# Patient Record
Sex: FEMALE | Race: WHITE | NOT HISPANIC OR LATINO | Employment: OTHER | URBAN - NONMETROPOLITAN AREA
[De-identification: names, ages, dates, MRNs, and addresses within clinical notes are randomized per-mention and may not be internally consistent; named-entity substitution may affect disease eponyms.]

---

## 2023-11-01 ENCOUNTER — HOSPITAL ENCOUNTER (EMERGENCY)
Facility: HOSPITAL | Age: 83
Discharge: HOME | End: 2023-11-01
Attending: STUDENT IN AN ORGANIZED HEALTH CARE EDUCATION/TRAINING PROGRAM
Payer: MEDICARE

## 2023-11-01 VITALS
BODY MASS INDEX: 26.88 KG/M2 | HEART RATE: 61 BPM | TEMPERATURE: 98.5 F | OXYGEN SATURATION: 97 % | HEIGHT: 60 IN | WEIGHT: 136.91 LBS | RESPIRATION RATE: 16 BRPM | DIASTOLIC BLOOD PRESSURE: 66 MMHG | SYSTOLIC BLOOD PRESSURE: 147 MMHG

## 2023-11-01 DIAGNOSIS — E83.42 HYPOMAGNESEMIA: ICD-10-CM

## 2023-11-01 DIAGNOSIS — R11.2 NAUSEA VOMITING AND DIARRHEA: Primary | ICD-10-CM

## 2023-11-01 DIAGNOSIS — E87.6 HYPOKALEMIA: ICD-10-CM

## 2023-11-01 DIAGNOSIS — R19.7 NAUSEA VOMITING AND DIARRHEA: Primary | ICD-10-CM

## 2023-11-01 LAB
ALBUMIN SERPL BCP-MCNC: 4.6 G/DL (ref 3.5–5)
ALP SERPL-CCNC: 110 U/L (ref 34–104)
ALT SERPL W P-5'-P-CCNC: 40 U/L (ref 7–52)
ANION GAP SERPL CALCULATED.3IONS-SCNC: 11 MMOL/L
AST SERPL W P-5'-P-CCNC: 26 U/L (ref 13–39)
BASOPHILS # BLD AUTO: 0.01 THOUSANDS/ÂΜL (ref 0–0.1)
BASOPHILS NFR BLD AUTO: 0 % (ref 0–1)
BILIRUB SERPL-MCNC: 0.99 MG/DL (ref 0.2–1)
BUN SERPL-MCNC: 19 MG/DL (ref 5–25)
CALCIUM SERPL-MCNC: 10.1 MG/DL (ref 8.4–10.2)
CHLORIDE SERPL-SCNC: 106 MMOL/L (ref 96–108)
CO2 SERPL-SCNC: 24 MMOL/L (ref 21–32)
CREAT SERPL-MCNC: 0.97 MG/DL (ref 0.6–1.3)
EOSINOPHIL # BLD AUTO: 0.05 THOUSAND/ÂΜL (ref 0–0.61)
EOSINOPHIL NFR BLD AUTO: 1 % (ref 0–6)
ERYTHROCYTE [DISTWIDTH] IN BLOOD BY AUTOMATED COUNT: 12.2 % (ref 11.6–15.1)
GFR SERPL CREATININE-BSD FRML MDRD: 53 ML/MIN/1.73SQ M
GLUCOSE SERPL-MCNC: 128 MG/DL (ref 65–140)
HCT VFR BLD AUTO: 39.1 % (ref 34.8–46.1)
HGB BLD-MCNC: 13.6 G/DL (ref 11.5–15.4)
IMM GRANULOCYTES # BLD AUTO: 0.01 THOUSAND/UL (ref 0–0.2)
IMM GRANULOCYTES NFR BLD AUTO: 0 % (ref 0–2)
LYMPHOCYTES # BLD AUTO: 1.06 THOUSANDS/ÂΜL (ref 0.6–4.47)
LYMPHOCYTES NFR BLD AUTO: 15 % (ref 14–44)
MAGNESIUM SERPL-MCNC: 1.7 MG/DL (ref 1.9–2.7)
MCH RBC QN AUTO: 30.8 PG (ref 26.8–34.3)
MCHC RBC AUTO-ENTMCNC: 34.8 G/DL (ref 31.4–37.4)
MCV RBC AUTO: 89 FL (ref 82–98)
MONOCYTES # BLD AUTO: 0.94 THOUSAND/ÂΜL (ref 0.17–1.22)
MONOCYTES NFR BLD AUTO: 13 % (ref 4–12)
NEUTROPHILS # BLD AUTO: 4.99 THOUSANDS/ÂΜL (ref 1.85–7.62)
NEUTS SEG NFR BLD AUTO: 71 % (ref 43–75)
NRBC BLD AUTO-RTO: 0 /100 WBCS
PLATELET # BLD AUTO: 268 THOUSANDS/UL (ref 149–390)
PMV BLD AUTO: 11.1 FL (ref 8.9–12.7)
POTASSIUM SERPL-SCNC: 3.3 MMOL/L (ref 3.5–5.3)
PROT SERPL-MCNC: 7.6 G/DL (ref 6.4–8.4)
RBC # BLD AUTO: 4.42 MILLION/UL (ref 3.81–5.12)
SODIUM SERPL-SCNC: 141 MMOL/L (ref 135–147)
WBC # BLD AUTO: 7.06 THOUSAND/UL (ref 4.31–10.16)

## 2023-11-01 PROCEDURE — 85025 COMPLETE CBC W/AUTO DIFF WBC: CPT | Performed by: STUDENT IN AN ORGANIZED HEALTH CARE EDUCATION/TRAINING PROGRAM

## 2023-11-01 PROCEDURE — 80053 COMPREHEN METABOLIC PANEL: CPT | Performed by: STUDENT IN AN ORGANIZED HEALTH CARE EDUCATION/TRAINING PROGRAM

## 2023-11-01 PROCEDURE — 99284 EMERGENCY DEPT VISIT MOD MDM: CPT | Performed by: STUDENT IN AN ORGANIZED HEALTH CARE EDUCATION/TRAINING PROGRAM

## 2023-11-01 PROCEDURE — 96365 THER/PROPH/DIAG IV INF INIT: CPT

## 2023-11-01 PROCEDURE — 96367 TX/PROPH/DG ADDL SEQ IV INF: CPT

## 2023-11-01 PROCEDURE — 96375 TX/PRO/DX INJ NEW DRUG ADDON: CPT

## 2023-11-01 PROCEDURE — 36415 COLL VENOUS BLD VENIPUNCTURE: CPT | Performed by: STUDENT IN AN ORGANIZED HEALTH CARE EDUCATION/TRAINING PROGRAM

## 2023-11-01 PROCEDURE — 83735 ASSAY OF MAGNESIUM: CPT | Performed by: STUDENT IN AN ORGANIZED HEALTH CARE EDUCATION/TRAINING PROGRAM

## 2023-11-01 PROCEDURE — 99283 EMERGENCY DEPT VISIT LOW MDM: CPT

## 2023-11-01 RX ORDER — SODIUM CHLORIDE, SODIUM GLUCONATE, SODIUM ACETATE, POTASSIUM CHLORIDE, MAGNESIUM CHLORIDE, SODIUM PHOSPHATE, DIBASIC, AND POTASSIUM PHOSPHATE .53; .5; .37; .037; .03; .012; .00082 G/100ML; G/100ML; G/100ML; G/100ML; G/100ML; G/100ML; G/100ML
1000 INJECTION, SOLUTION INTRAVENOUS ONCE
Status: COMPLETED | OUTPATIENT
Start: 2023-11-01 | End: 2023-11-01

## 2023-11-01 RX ORDER — LINACLOTIDE 290 UG/1
290 CAPSULE, GELATIN COATED ORAL DAILY
COMMUNITY

## 2023-11-01 RX ORDER — ATORVASTATIN CALCIUM 40 MG/1
40 TABLET, FILM COATED ORAL DAILY
COMMUNITY
Start: 2023-10-30

## 2023-11-01 RX ORDER — LISINOPRIL 10 MG/1
10 TABLET ORAL DAILY
COMMUNITY
Start: 2023-07-06 | End: 2024-07-05

## 2023-11-01 RX ORDER — METOPROLOL SUCCINATE 25 MG/1
25 TABLET, EXTENDED RELEASE ORAL DAILY
COMMUNITY
Start: 2023-06-07 | End: 2024-06-06

## 2023-11-01 RX ORDER — AMLODIPINE BESYLATE 10 MG/1
10 TABLET ORAL DAILY
COMMUNITY
Start: 2022-11-28 | End: 2023-11-28

## 2023-11-01 RX ORDER — POTASSIUM CHLORIDE 20 MEQ/1
40 TABLET, EXTENDED RELEASE ORAL ONCE
Status: COMPLETED | OUTPATIENT
Start: 2023-11-01 | End: 2023-11-01

## 2023-11-01 RX ORDER — MAGNESIUM SULFATE HEPTAHYDRATE 40 MG/ML
2 INJECTION, SOLUTION INTRAVENOUS ONCE
Status: COMPLETED | OUTPATIENT
Start: 2023-11-01 | End: 2023-11-01

## 2023-11-01 RX ORDER — KETOROLAC TROMETHAMINE 30 MG/ML
15 INJECTION, SOLUTION INTRAMUSCULAR; INTRAVENOUS ONCE
Status: COMPLETED | OUTPATIENT
Start: 2023-11-01 | End: 2023-11-01

## 2023-11-01 RX ADMIN — MAGNESIUM SULFATE HEPTAHYDRATE 2 G: 40 INJECTION, SOLUTION INTRAVENOUS at 17:22

## 2023-11-01 RX ADMIN — POTASSIUM CHLORIDE 40 MEQ: 1500 TABLET, EXTENDED RELEASE ORAL at 17:19

## 2023-11-01 RX ADMIN — KETOROLAC TROMETHAMINE 15 MG: 30 INJECTION, SOLUTION INTRAMUSCULAR; INTRAVENOUS at 16:11

## 2023-11-01 RX ADMIN — SODIUM CHLORIDE, SODIUM GLUCONATE, SODIUM ACETATE, POTASSIUM CHLORIDE, MAGNESIUM CHLORIDE, SODIUM PHOSPHATE, DIBASIC, AND POTASSIUM PHOSPHATE 1000 ML: .53; .5; .37; .037; .03; .012; .00082 INJECTION, SOLUTION INTRAVENOUS at 16:11

## 2023-11-01 NOTE — ED PROVIDER NOTES
History  Chief Complaint   Patient presents with    Diarrhea     Patient reports constipation x 2 months. States she completed prep for colonoscopy 10/19 but could not have it completed due to impaction. Patient reports vomiting, diarrhea since last night. History provided by:  Medical records and patient  Diarrhea  Quality:  Watery  Severity:  Moderate  Onset quality:  Gradual  Number of episodes:  Multiple episdoes of diarreha; had 3 episodes of vomiting. Last episode of diarrhea was approx 2 hours ago. Duration:  18 hours  Timing:  Constant  Progression:  Improving (Recent colonoscopy--unable to be performed due to fecal impaction. Has been taking Linzess since then. Developed N/V/D last night. Denies suspicios food intake/recent sick contacts/bloody stool/fever/abd pain.)  Relieved by:  Nothing  Worsened by:  Nothing  Ineffective treatments:  None tried  Associated symptoms: abdominal pain, arthralgias, chills and vomiting    Associated symptoms: no recent cough, no headaches, no myalgias and no URI    Risk factors: no recent antibiotic use, no sick contacts, no suspicious food intake and no travel to endemic areas      Past Medical History:   Diagnosis Date    CAD (coronary artery disease)     Hyperlipidemia     Hypertension     Kidney cyst, acquired     Kidney stone     MI, old 11/13/2021    NSTEMI (non-ST elevated myocardial infarction) [facility])        Past Surgical History:   Procedure Laterality Date    APPENDECTOMY      CHOLECYSTECTOMY      HYSTERECTOMY         History reviewed. No pertinent family history. I have reviewed and agree with the history as documented.     E-Cigarette/Vaping    E-Cigarette Use Never User      E-Cigarette/Vaping Substances     Social History     Tobacco Use    Smoking status: Never    Smokeless tobacco: Never   Vaping Use    Vaping Use: Never used   Substance Use Topics    Alcohol use: Never    Drug use: Never     Review of Systems   Constitutional:  Positive for activity change, appetite change, chills and fatigue. Respiratory:  Negative for cough, chest tightness, shortness of breath and wheezing. Cardiovascular:  Negative for chest pain, palpitations and leg swelling. Gastrointestinal:  Positive for abdominal pain, constipation, diarrhea, nausea and vomiting. Negative for abdominal distention. Genitourinary:  Negative for decreased urine volume, difficulty urinating, dysuria, flank pain, frequency, pelvic pain and urgency. Musculoskeletal:  Positive for arthralgias. Negative for back pain and myalgias. Skin:  Negative for color change, pallor, rash and wound. Neurological:  Negative for dizziness, syncope, weakness, light-headedness and headaches. Psychiatric/Behavioral:  Positive for sleep disturbance. Negative for agitation, confusion and decreased concentration. All other systems reviewed and are negative. Physical Exam  Physical Exam  Vitals and nursing note reviewed. Constitutional:       General: She is not in acute distress. Appearance: She is not ill-appearing or toxic-appearing. HENT:      Head: Normocephalic and atraumatic. Right Ear: External ear normal.      Left Ear: External ear normal.   Eyes:      General: No scleral icterus. Right eye: No discharge. Left eye: No discharge. Extraocular Movements: Extraocular movements intact. Conjunctiva/sclera: Conjunctivae normal.   Cardiovascular:      Rate and Rhythm: Normal rate and regular rhythm. Pulmonary:      Effort: Pulmonary effort is normal. No respiratory distress. Breath sounds: Normal breath sounds. No stridor. No wheezing, rhonchi or rales. Chest:      Chest wall: No tenderness. Abdominal:      General: Bowel sounds are normal. There is no distension. Palpations: Abdomen is soft. Tenderness: There is abdominal tenderness. There is no right CVA tenderness, left CVA tenderness, guarding or rebound.    Musculoskeletal:         General: No swelling, tenderness, deformity or signs of injury. Cervical back: Neck supple. No tenderness. Right lower leg: No edema. Left lower leg: No edema. Skin:     General: Skin is warm and dry. Capillary Refill: Capillary refill takes less than 2 seconds. Coloration: Skin is not jaundiced or pale. Findings: No bruising, erythema, lesion or rash. Neurological:      General: No focal deficit present. Mental Status: She is alert and oriented to person, place, and time.    Psychiatric:         Mood and Affect: Mood normal.         Behavior: Behavior normal.         Vital Signs  ED Triage Vitals [11/01/23 1532]   Temperature Pulse Respirations Blood Pressure SpO2   98.5 °F (36.9 °C) (!) 114 18 170/88 98 %      Temp Source Heart Rate Source Patient Position - Orthostatic VS BP Location FiO2 (%)   Temporal Monitor Lying Right arm --      Pain Score       5           Vitals:    11/01/23 1700 11/01/23 1730 11/01/23 1800 11/01/23 1830   BP: 161/70 147/66 140/65 147/66   Pulse: 64 63 61 61   Patient Position - Orthostatic VS:             Visual Acuity      ED Medications  Medications   multi-electrolyte (ISOLYTE-S PH 7.4) bolus 1,000 mL (0 mL Intravenous Stopped 11/1/23 1711)   ketorolac (TORADOL) injection 15 mg (15 mg Intravenous Given 11/1/23 1611)   potassium chloride (K-DUR,KLOR-CON) CR tablet 40 mEq (40 mEq Oral Given 11/1/23 1719)   magnesium sulfate 2 g/50 mL IVPB (premix) 2 g (0 g Intravenous Stopped 11/1/23 1752)       Diagnostic Studies  Results Reviewed       Procedure Component Value Units Date/Time    Comprehensive metabolic panel [[id number]]  (Abnormal) Collected: 11/01/23 1612    Lab Status: Final result Specimen: Blood from Arm, Left Updated: 11/01/23 1633     Sodium 141 mmol/L      Potassium 3.3 mmol/L      Chloride 106 mmol/L      CO2 24 mmol/L      ANION GAP 11 mmol/L      BUN 19 mg/dL      Creatinine 0.97 mg/dL      Glucose 128 mg/dL      Calcium 10.1 mg/dL      AST 26 U/L ALT 40 U/L      Alkaline Phosphatase 110 U/L      Total Protein 7.6 g/dL      Albumin 4.6 g/dL      Total Bilirubin 0.99 mg/dL      eGFR 53 ml/min/1.73sq m     Narrative:      [facility] (CKD):     Stage 1 with normal or high GFR (GFR > 90 mL/min/1.73 square meters)    Stage 2 Mild CKD (GFR = 60-89 mL/min/1.73 square meters)    Stage 3A Moderate CKD (GFR = 45-59 mL/min/1.73 square meters)    Stage 3B Moderate CKD (GFR = 30-44 mL/min/1.73 square meters)    Stage 4 Severe CKD (GFR = 15-29 mL/min/1.73 square meters)    Stage 5 End Stage CKD (GFR <15 mL/min/1.73 square meters)  Note: GFR calculation is accurate only with a steady state creatinine    Magnesium [[id number]]  (Abnormal) Collected: 11/01/23 1612    Lab Status: Final result Specimen: Blood from Arm, Left Updated: 11/01/23 1633     Magnesium 1.7 mg/dL     CBC and differential [[id number]]  (Abnormal) Collected: 11/01/23 1612    Lab Status: Final result Specimen: Blood from Arm, Left Updated: 11/01/23 1617     WBC 7.06 Thousand/uL      RBC 4.42 Million/uL      Hemoglobin 13.6 g/dL      Hematocrit 39.1 %      MCV 89 fL      MCH 30.8 pg      MCHC 34.8 g/dL      RDW 12.2 %      MPV 11.1 fL      Platelets 843 Thousands/uL      nRBC 0 /100 WBCs      Neutrophils Relative 71 %      Immat GRANS % 0 %      Lymphocytes Relative 15 %      Monocytes Relative 13 %      Eosinophils Relative 1 %      Basophils Relative 0 %      Neutrophils Absolute 4.99 Thousands/µL      Immature Grans Absolute 0.01 Thousand/uL      Lymphocytes Absolute 1.06 Thousands/µL      Monocytes Absolute 0.94 Thousand/µL      Eosinophils Absolute 0.05 Thousand/µL      Basophils Absolute 0.01 Thousands/µL                    No orders to display              Procedures  Procedures         ED Course  ED Course as of 11/01/23 1845   Wed Nov 01, 2023   1622 No leukocytosis. Hemoglobin is within normal limits. Normal platelet count.    [facility] Mild hypokalemia/hypomagnesemia. Will replace. Findings likely secondary to GI losses. 1825 Upon reevaluation, the patient is feeling well. Able to tolerate p.o. SBIRT 20yo+      Flowsheet Row Most Recent Value   Initial Alcohol Screen: US AUDIT-C     1. How often do you have a drink containing alcohol? 0 Filed at: 11/01/2023 1534   2. How many drinks containing alcohol do you have on a typical day you are drinking? 0 Filed at: 11/01/2023 1534   3b. FEMALE Any Age, or MALE 65+: How often do you have 4 or more drinks on one occassion? 0 Filed at: 11/01/2023 1534   Audit-C Score 0 Filed at: 11/01/2023 1534   ANGEL: How many times in the past year have you. .. Used an illegal drug or used a prescription medication for non-medical reasons? Never Filed at: 11/01/2023 1534                      Medical Decision Making  The differential diagnoses include but are not limited to enteritis, colitis, small bowel obstruction, diverticulitis, medication side effect  Vital signs reviewed. Abdominal exam benign. Mild dehydration, electrolyte abnormalities noted on laboratory work-up. IV fluid bolus administered, electrolytes replaced. Repeat abdominal exam remained benign. The patient was able to tolerate PO. No episodes of N/V/D throughout the course of treatment. OP GI referral provided. Recommendations/return precautions were discussed with the patient. All questions addressed. Stable for discharge. Problems Addressed:  Hypokalemia: acute illness or injury  Hypomagnesemia: acute illness or injury  Nausea vomiting and diarrhea: acute illness or injury    Amount and/or Complexity of Data Reviewed  Labs: ordered. Decision-making details documented in ED Course. Risk  Prescription drug management.              Disposition  Final diagnoses:   Nausea vomiting and diarrhea   Hypokalemia   Hypomagnesemia     Time reflects when diagnosis was documented in both MDM as applicable and the Disposition within this note       Time User Action Codes Description Comment    11/1/2023  6:20 PM Renderrick Contreras Add [R11.2,  R19.7] Nausea vomiting and diarrhea     11/1/2023  6:21 PM Rendoce Ben Add [E87.6] Hypokalemia     11/1/2023  6:21 PM Henry Contreras Add [E83.42] Hypomagnesemia           ED Disposition       ED Disposition   Discharge    Condition   Stable    Date/Time   Wed Nov 1, 2023 2648 [id number] [facility] [address] discharge to home/self care.                    Follow-up Information       Follow up With Specialties Details Why Contact Info    Neno Brooks MD Gastroenterology In 1 week  [address] [facility]  [phone]              Discharge Medication List as of 11/1/2023  6:25 PM        CONTINUE these medications which have NOT CHANGED    Details   amLODIPine (NORVASC) 10 mg tablet Take 10 mg by mouth daily, Starting Mon 11/28/2022, Until Tue 11/28/2023, Historical Med      atorvastatin (LIPITOR) 40 mg tablet Take 40 mg by mouth daily, Starting Mon 10/30/2023, Historical Med      linaCLOtide (Linzess) 290 MCG CAPS Take 290 mcg by mouth in the morning, Historical Med      lisinopril (ZESTRIL) 10 mg tablet Take 10 mg by mouth daily, Starting Thu 7/6/2023, Until Fri 7/5/2024, Historical Med      metoprolol succinate (TOPROL-XL) 25 mg 24 hr tablet Take 25 mg by mouth daily, Starting Wed 6/7/2023, Until Thu 6/6/2024, Historical Med                 PDMP Review       None            ED Provider  Electronically Signed by             Jo Blankenship DO  11/01/23 3919

## 2023-11-01 NOTE — DISCHARGE INSTRUCTIONS
The labs that were obtained showed signs of mild dehydration likely due to the nausea/vomiting/diarrhea. Drink plenty of clear/hydrating fluids over the next few days. Follow-up with your primary care provider in 3-5 days for repeat labs. An outpatient referral to gastroenterology was provided. Expect a phone call within the next few days to set up the appointment. Return to the emergency department for any concerning signs or symptoms.

## 2023-11-09 ENCOUNTER — CONSULT (OUTPATIENT)
Dept: GASTROENTEROLOGY | Facility: CLINIC | Age: 83
End: 2023-11-09
Payer: MEDICARE

## 2023-11-09 VITALS
TEMPERATURE: 99.3 F | DIASTOLIC BLOOD PRESSURE: 72 MMHG | WEIGHT: 135 LBS | SYSTOLIC BLOOD PRESSURE: 140 MMHG | HEIGHT: 60 IN | BODY MASS INDEX: 26.5 KG/M2

## 2023-11-09 DIAGNOSIS — R19.7 NAUSEA VOMITING AND DIARRHEA: ICD-10-CM

## 2023-11-09 DIAGNOSIS — R11.2 NAUSEA VOMITING AND DIARRHEA: ICD-10-CM

## 2023-11-09 DIAGNOSIS — K59.04 CHRONIC IDIOPATHIC CONSTIPATION: ICD-10-CM

## 2023-11-09 DIAGNOSIS — R13.19 ESOPHAGEAL DYSPHAGIA: Primary | ICD-10-CM

## 2023-11-09 PROBLEM — R13.10 DYSPHAGIA: Status: ACTIVE | Noted: 2023-11-09

## 2023-11-09 PROCEDURE — 99205 OFFICE O/P NEW HI 60 MIN: CPT | Performed by: INTERNAL MEDICINE

## 2023-11-09 RX ORDER — LINACLOTIDE 72 UG/1
72 CAPSULE, GELATIN COATED ORAL DAILY
Qty: 30 CAPSULE | Refills: 6 | Status: SHIPPED | OUTPATIENT
Start: 2023-11-09 | End: 2023-12-09

## 2023-11-09 NOTE — PATIENT INSTRUCTIONS
Scheduled date of EGD(as of today):11.22.23  Physician performing EGD:DR TAPIA  Location of EGD:[facility]  Instructions reviewed with patient by:JAILYN  Clearances: CARDIAC

## 2023-11-09 NOTE — PROGRESS NOTES
[facility] - Outpatient Consultation  Kal Blankenship 79 y.o. female MRN: [id number]  Encounter: [id number]          ASSESSMENT AND PLAN:      1. Nausea vomiting and diarrhea-acute episode this is related to gastroenteritis versus secondary to Linzess side effect this required evaluation in the ED. because of electrolyte derangement with low magnesium and potassium  We will recheck these labs prior to undergoing further endoscopic evaluation  - Ambulatory Referral to Gastroenterology    2. Chronic idiopathic constipation-previously tried 290 mcg of Dyane Kasal presents here for second opinion. Not happy with their care at an outside facility. We discussed importance of fiber and fluid which is lacking but patient is working on optimizing this. We will plan for potential colonoscopy in the future if weight loss continues to be an issue. We will wait for improvement of constipation prior to doing this. - linaCLOtide (Linzess) 72 MCG CAPS; Take 72 mcg by mouth daily  Dispense: 30 capsule; Refill: 6  - Basic metabolic panel; Future  - Magnesium; Future  - TSH, 3rd generation with Free T4 reflex; Future    3. Esophageal dysphagia  4. Unintentional weight loss of 10 pounds in setting of recent hospitalization x2. I suspect weight loss to be secondary to acute illness but if weight does not regain or continues to decrease we may have to plan for colonoscopy more urgently otherwise we will plan for once constipation is improved. We will currently plan for EGD evaluation to rule out any causes which may be resulting in modification of diet as she is avoiding solid foods at this time.  - EGD;  Future    Plan discussed with family  All her questions were answered  Reviewed labs including BMP, magnesium levels and CBC from outside facility since labs are low we will plan to repeat these labs  reviewed imaging study from outside facility  We will reach out to her cardiologist for clearance due to history of coronary artery event 2 years ago with an NSTEMI  Discussed importance of fiber and fluid intake  Start Linzess at a lower dose of 72 mcg      ______________________________________________________________________    HPI:      She presents here for second opinion. She was evaluated Dr. Elena Samuels recently    Patient is a 78 yo female with her  and her daughter for second opinion for management of constipation, esophageal dysphagia, and recent episode of nausea vomiting and diarrhea. She has had history of severe constipation which has led to vasovagal episodes resulting in hospitalization due to excessive straining. She was started on Linzess 290 mcg but this resulted in a lot of diarrhea. Patient also had a poor preparation with a colonoscopy attempt recently. Patient recently had another episode of nausea vomiting and diarrhea resulting in rehospitalization due to electrolyte derangement. She was not happy with her care and presents here for second opinion at [facility]. She also reports intermittent solid food dysphagia has modified her diet. She has not had recent endoscopic evaluation. REVIEW OF SYSTEMS:    CONSTITUTIONAL: Denies any fever, chills, rigors, and weight loss. HEENT: No earache or tinnitus. Denies hearing loss or visual disturbances. CARDIOVASCULAR: No chest pain or palpitations. RESPIRATORY: Denies any cough, hemoptysis, shortness of breath or dyspnea on exertion. GASTROINTESTINAL: As noted in the History of Present Illness. GENITOURINARY: No problems with urination. Denies any hematuria or dysuria. NEUROLOGIC: No dizziness or vertigo, denies headaches. MUSCULOSKELETAL: Denies any muscle or joint pain. SKIN: Denies skin rashes or itching. ENDOCRINE: Denies excessive thirst. Denies intolerance to heat or cold. PSYCHOSOCIAL: Denies depression or anxiety. Denies any recent memory loss.        Historical Information   Past Medical History: Diagnosis Date    CAD (coronary artery disease)     Hyperlipidemia     Hypertension     Kidney cyst, acquired     Kidney stone     MI, old 11/13/2021    NSTEMI (non-ST elevated myocardial infarction) [facility])      Past Surgical History:   Procedure Laterality Date    APPENDECTOMY      CHOLECYSTECTOMY      HYSTERECTOMY       Social History   Social History     Substance and Sexual Activity   Alcohol Use Never     Social History     Substance and Sexual Activity   Drug Use Never     Social History     Tobacco Use   Smoking Status Never   Smokeless Tobacco Never     History reviewed. No pertinent family history. Meds/Allergies       Current Outpatient Medications:     amLODIPine (NORVASC) 10 mg tablet    atorvastatin (LIPITOR) 40 mg tablet    linaCLOtide (Linzess) 72 MCG CAPS    lisinopril (ZESTRIL) 10 mg tablet    metoprolol succinate (TOPROL-XL) 25 mg 24 hr tablet    Allergies   Allergen Reactions    Azithromycin Other (See Comments)    Iodinated Contrast Media Hives     ivp dye    Lidocaine Hives     Hives, dental work, 1980s    Hives, dental work, 1980s   hives    Metronidazole Other (See Comments)    Penicillins Hives     hives    Polyethylene Glycol Hives    Procaine Hives     1987 for cavity/filling; 45 minutes after injection. 1987 for cavity/filling; 45 minutes after injection. hives    Sodium Metabisulfite Other (See Comments)     Flushing    Sulfa Antibiotics Other (See Comments)    Ticagrelor Rash           Objective     Blood pressure 140/72, temperature 99.3 °F (37.4 °C), temperature source Tympanic, height 5' (1.524 m), weight 61.2 kg (135 lb). Body mass index is 26.37 kg/m². PHYSICAL EXAM:      General Appearance:   Alert, cooperative, no distress   HEENT:   Normocephalic, atraumatic, anicteric.      Neck:  Supple, symmetrical, trachea midline   Lungs:   Clear to auscultation bilaterally; no rales, rhonchi or wheezing; respirations unlabored    Heart[de-identified]   Regular rate and rhythm; no murmur, rub, or gallop. Abdomen:   Soft, non-tender, non-distended; normal bowel sounds; no masses, no organomegaly    Genitalia:   Deferred    Rectal:   Deferred    Extremities:  No cyanosis, clubbing or edema    Pulses:  2+ and symmetric    Skin:  No jaundice, rashes, or lesions    Lymph nodes:  No palpable cervical lymphadenopathy        Lab Results:   No visits with results within 1 Day(s) from this visit.    Latest known visit with results is:   Admission on 11/01/2023, Discharged on 11/01/2023   Component Date Value    WBC 11/01/2023 7.06     RBC 11/01/2023 4.42     Hemoglobin 11/01/2023 13.6     Hematocrit 11/01/2023 39.1     MCV 11/01/2023 89     MCH 11/01/2023 30.8     MCHC 11/01/2023 34.8     RDW 11/01/2023 12.2     MPV 11/01/2023 11.1     Platelets 49/60/8676 268     nRBC 11/01/2023 0     Neutrophils Relative 11/01/2023 71     Immat GRANS % 11/01/2023 0     Lymphocytes Relative 11/01/2023 15     Monocytes Relative 11/01/2023 13 (H)     Eosinophils Relative 11/01/2023 1     Basophils Relative 11/01/2023 0     Neutrophils Absolute 11/01/2023 4.99     Immature Grans Absolute 11/01/2023 0.01     Lymphocytes Absolute 11/01/2023 1.06     Monocytes Absolute 11/01/2023 0.94     Eosinophils Absolute 11/01/2023 0.05     Basophils Absolute 11/01/2023 0.01     Sodium 11/01/2023 141     Potassium 11/01/2023 3.3 (L)     Chloride 11/01/2023 106     CO2 11/01/2023 24     ANION GAP 11/01/2023 11     BUN 11/01/2023 19     Creatinine 11/01/2023 0.97     Glucose 11/01/2023 128     Calcium 11/01/2023 10.1     AST 11/01/2023 26     ALT 11/01/2023 40     Alkaline Phosphatase 11/01/2023 110 (H)     Total Protein 11/01/2023 7.6     Albumin 11/01/2023 4.6     Total Bilirubin 11/01/2023 0.99     eGFR 11/01/2023 53     Magnesium 11/01/2023 1.7 (L)          Radiology Results:   GI IMAGE CAPTURE - COLONOSCOPY    Result Date: 10/19/2023  Narrative: Please See Opnote for Result

## 2023-11-20 ENCOUNTER — NURSE TRIAGE (OUTPATIENT)
Age: 83
End: 2023-11-20

## 2023-11-20 NOTE — TELEPHONE ENCOUNTER
Reason for Disposition   Information only question and nurse able to answer    Answer Assessment - Initial Assessment Questions  1. REASON FOR CALL or QUESTION: pt. Calling asking if we received her clearance from cardiologist, pt. Informed we received clearance from her cardiologist today and pt. Is to proceed with her EGD on 11/22/23    Protocols used:  Information Only Call - No Triage-ADULT-[place]

## 2023-11-22 ENCOUNTER — ANESTHESIA EVENT (OUTPATIENT)
Dept: GASTROENTEROLOGY | Facility: HOSPITAL | Age: 83
End: 2023-11-22

## 2023-11-22 ENCOUNTER — ANESTHESIA (OUTPATIENT)
Dept: GASTROENTEROLOGY | Facility: HOSPITAL | Age: 83
End: 2023-11-22

## 2023-11-22 ENCOUNTER — HOSPITAL ENCOUNTER (OUTPATIENT)
Dept: GASTROENTEROLOGY | Facility: HOSPITAL | Age: 83
Setting detail: OUTPATIENT SURGERY
Discharge: HOME | End: 2023-11-22
Attending: INTERNAL MEDICINE
Payer: MEDICARE

## 2023-11-22 VITALS
TEMPERATURE: 97.3 F | SYSTOLIC BLOOD PRESSURE: 129 MMHG | OXYGEN SATURATION: 96 % | HEART RATE: 66 BPM | RESPIRATION RATE: 18 BRPM | DIASTOLIC BLOOD PRESSURE: 60 MMHG

## 2023-11-22 DIAGNOSIS — R27.8 DYSSYNERGIA: Primary | ICD-10-CM

## 2023-11-22 DIAGNOSIS — R13.19 ESOPHAGEAL DYSPHAGIA: ICD-10-CM

## 2023-11-22 PROBLEM — I21.4 NSTEMI (NON-ST ELEVATED MYOCARDIAL INFARCTION) (HCC): Chronic | Status: ACTIVE | Noted: 2023-11-22

## 2023-11-22 PROBLEM — I25.10 CAD (CORONARY ARTERY DISEASE): Status: ACTIVE | Noted: 2023-11-22

## 2023-11-22 PROBLEM — I10 HYPERTENSION: Status: ACTIVE | Noted: 2023-11-22

## 2023-11-22 PROBLEM — I21.4 NSTEMI (NON-ST ELEVATED MYOCARDIAL INFARCTION) (HCC): Status: ACTIVE | Noted: 2023-11-22

## 2023-11-22 PROBLEM — E78.5 HLD (HYPERLIPIDEMIA): Status: ACTIVE | Noted: 2023-11-22

## 2023-11-22 PROCEDURE — 43249 ESOPH EGD DILATION <30 MM: CPT | Performed by: INTERNAL MEDICINE

## 2023-11-22 PROCEDURE — 43251 EGD REMOVE LESION SNARE: CPT | Performed by: INTERNAL MEDICINE

## 2023-11-22 PROCEDURE — 43239 EGD BIOPSY SINGLE/MULTIPLE: CPT | Performed by: INTERNAL MEDICINE

## 2023-11-22 PROCEDURE — C1726 CATH, BAL DIL, NON-VASCULAR: HCPCS

## 2023-11-22 PROCEDURE — 88305 TISSUE EXAM BY PATHOLOGIST: CPT | Performed by: PATHOLOGY

## 2023-11-22 RX ORDER — ASPIRIN 81 MG/1
81 TABLET, CHEWABLE ORAL DAILY
COMMUNITY

## 2023-11-22 RX ORDER — SODIUM CHLORIDE, SODIUM LACTATE, POTASSIUM CHLORIDE, CALCIUM CHLORIDE 600; 310; 30; 20 MG/100ML; MG/100ML; MG/100ML; MG/100ML
125 INJECTION, SOLUTION INTRAVENOUS CONTINUOUS
Status: DISCONTINUED | OUTPATIENT
Start: 2023-11-22 | End: 2023-11-26 | Stop reason: HOSPADM

## 2023-11-22 RX ORDER — PROPOFOL 10 MG/ML
INJECTION, EMULSION INTRAVENOUS AS NEEDED
Status: DISCONTINUED | OUTPATIENT
Start: 2023-11-22 | End: 2023-11-22

## 2023-11-22 RX ADMIN — PROPOFOL 20 MG: 10 INJECTION, EMULSION INTRAVENOUS at 07:47

## 2023-11-22 RX ADMIN — PROPOFOL 120 MG: 10 INJECTION, EMULSION INTRAVENOUS at 07:32

## 2023-11-22 RX ADMIN — PROPOFOL 30 MG: 10 INJECTION, EMULSION INTRAVENOUS at 07:36

## 2023-11-22 RX ADMIN — PROPOFOL 50 MG: 10 INJECTION, EMULSION INTRAVENOUS at 07:43

## 2023-11-22 RX ADMIN — SODIUM CHLORIDE, SODIUM LACTATE, POTASSIUM CHLORIDE, AND CALCIUM CHLORIDE 125 ML/HR: .6; .31; .03; .02 INJECTION, SOLUTION INTRAVENOUS at 06:47

## 2023-11-22 NOTE — H&P
[facility]  History & Physical    Patient Info:  Name: Becky Palma   Age: 79 y.o. YOB: 1940   MRN: [id number]    HPI: Becky Palma is a 79y.o. year old female who presents for EGD for evaluation of N/V, epigastric pain, dysphagia. REVIEW OF SYSTEMS: Per the HPI, and otherwise unremarkable. Historical Information   Past Medical History:   Diagnosis Date    CAD (coronary artery disease)     Hyperlipidemia     Hypertension     Kidney cyst, acquired     Kidney stone     MI, old 11/13/2021    NSTEMI (non-ST elevated myocardial infarction) [facility])      Past Surgical History:   Procedure Laterality Date    APPENDECTOMY      CHOLECYSTECTOMY      HYSTERECTOMY       Social History   Social History     Substance and Sexual Activity   Alcohol Use Never     Social History     Substance and Sexual Activity   Drug Use Never     Social History     Tobacco Use   Smoking Status Never   Smokeless Tobacco Never     History reviewed. No pertinent family history. MEDICATIONS & ALLERGIES:    Current Outpatient Medications   Medication Instructions    amLODIPine (NORVASC) 10 mg, Oral, Daily    aspirin 81 mg, Oral, Daily    atorvastatin (LIPITOR) 40 mg, Oral, Daily    Linzess 72 mcg, Oral, Daily    lisinopril (ZESTRIL) 10 mg, Oral, Daily    metoprolol succinate (TOPROL-XL) 25 mg, Oral, Daily     Allergies   Allergen Reactions    Azithromycin Other (See Comments)    Iodinated Contrast Media Hives     ivp dye    Lidocaine Hives     Hives, dental work, 1980s    Hives, dental work, 1980s   hives    Metronidazole Other (See Comments)    Penicillins Hives     hives    Polyethylene Glycol Hives    Procaine Hives     1987 for cavity/filling; 45 minutes after injection. 1987 for cavity/filling; 45 minutes after injection.     hives    Sodium Metabisulfite Other (See Comments)     Flushing    Sulfa Antibiotics Other (See Comments)    Ticagrelor Rash       PHYSICAL EXAM:    Objective   Blood pressure 128/61, pulse 68, temperature 98.1 °F (36.7 °C), temperature source Temporal, resp. rate 18, SpO2 99 %. There is no height or weight on file to calculate BMI. Gen: NAD  CV: RRR  CHEST: Clear  ABD: Soft, NT/ND  EXT: No edema    ASSESSMENT AND PLAN:  This is a 79y.o. year old female here for EGD, and she is stable and optimized for her procedure. Bethany Willis D.O. Gastroenterology Fellow  [address] [facility] [address]  Division of Gastroenterology & Hepatology  Available on Tiger[employer]    ** Please Note: This note is constructed using a voice recognition dictation system.  **

## 2023-11-22 NOTE — ANESTHESIA PREPROCEDURE EVALUATION
Procedure:  EGD    Relevant Problems   CARDIO   (+) CAD (coronary artery disease)   (+) HLD (hyperlipidemia)   (+) Hypertension   (+) NSTEMI (non-ST elevated myocardial infarction) (HCC)      GI/HEPATIC   (+) Dysphagia      Digestive   (+) Chronic idiopathic constipation   (+) Nausea vomiting and diarrhea        Physical Exam    Airway    Mallampati score: II  TM Distance: >3 FB  Neck ROM: full     Dental        Cardiovascular      Pulmonary      Other Findings  post-pubertal.      Anesthesia Plan  ASA Score- 3     Anesthesia Type- IV sedation with anesthesia with ASA Monitors. Additional Monitors:     Airway Plan:            Plan Factors-    Chart reviewed. Imaging results reviewed. Existing labs reviewed. Patient summary reviewed. Patient is not a current smoker. Induction- intravenous. Postoperative Plan-     Informed Consent- Anesthetic plan and risks discussed with patient. I personally reviewed this patient with the CRNA. Discussed and agreed on the Anesthesia Plan with the CRNA. Cam Zaldivar VITALS  /61   Pulse 68   Temp 98.1 °F (36.7 °C) (Temporal)   Resp 18   SpO2 99%   BP Readings from Last 3 Encounters:   11/22/23 128/61   11/09/23 140/72   11/01/23 147/66     LABS  Results from Last 12 Months   Lab Units 11/01/23  1612   SODIUM mmol/L 141   POTASSIUM mmol/L 3.3*   CHLORIDE mmol/L 106   CO2 mmol/L 24   ANION GAP mmol/L 11   BUN mg/dL 19   CREATININE mg/dL 0.97   CALCIUM mg/dL 10.1   GLUCOSE RANDOM mg/dL 128   MAGNESIUM mg/dL 1.7*   AST U/L 26   ALT U/L 40   ALK PHOS U/L 110*   TOTAL BILIRUBIN mg/dL 0.99   ALBUMIN g/dL 4.6     Results from Last 12 Months   Lab Units 11/01/23  1612   WBC Thousand/uL 7.06   HEMOGLOBIN g/dL 13.6   HEMATOCRIT % 39.1   PLATELETS Thousands/uL 268     No results for input(s): "APTT", "INR", "PTT" in the last 8784 hours. ECG  n/a  No results found for this or any previous visit (from the past 4464 hour(s)).   No results found for this or any previous visit. ECHOCARDIOGRAPHY, OTHER CARDIAC TESTING, AND IMAGING  2022 TTE    Left Ventricle: Left ventricle is normal in size. Wall thickness is   normal. Systolic function is normal with an ejection fraction of 60-65%. Inferoseptal hypokinesis noted. Right Ventricle: Right ventricle cavity is normal. Systolic function is   normal.    Aortic Valve: The aortic valve is trileaflet. Small mass again noted   on the aortic valve. ANESTHESIA RISK-BENEFIT DISCUSSION  BENEFITS INCLUDE THE FOLLOWING ([address] [id number], PMID [id number]): (1) Involvement of a dedicated anesthesia team reduces mortality and morbidity for major surgeries, (2) The team provides as much analgesia/sedation/amnesia/akinesia as is reasonably possible, and (3) The team strives to reduce discomfort and distress as much as reasonably achievable. RISKS (AND PLANS TO MITIGATE RISKS) INCLUDES THE FOLLOWING:    Neurologic Assessment: IntraOp awareness (Risk is ~1:1,000 - 1:14,000; PMID 30581603), Stroke (Risk ~<0.1-2% for most cases; PMID 55516059), nerve injury, vision loss, and POCD. Airway and Pulmonary Assessment: Dental or mouth injury, throat pain, critical hypoxia, pneumothorax, prolonged intubation, post-op respiratory compromise. Airway/Intubation risks and information: No prior advanced airway notes in [facility] EMR  Major ARISCAT risk factors include: Age >80: 1 pt, yielding a score 0-1= Low risk, 1.6%. Cardiovascular Assessment: Hypotension, arrhythmias, and pablo-op cardiac injury (MACE). In cases where specialized vascular access is needed, then additional risks including bleeding, infection, or injury to adjacent structures. If a bypass circuit is needed then risk of stroke, blood clot, and vasoplegia. Signs of active, severe cardiac instability: none  Hayder's RCRI score items: ICM, yielding an RCRI Score of 1= 0.6% risk of MACE  Are pablo-op or intra-op beta blockers indicated?  (PMID Q0040286): no, patient has already taken their beta blocker recently. FEN/GI Assessment: Aspiration (Approximately 0.5% risk per the IRIS trial) and PONV (10-80% depending on Apfel criteria). Does the patient meet ASA NPO guidelines?: Yes  Adverse Drug Risk Assessment: Allergic reactions, overdoses, drug-drug interactions, injury to a fetus or  in pregnant or breastfeeding patients, increased risk of injury or accident if performing potentially hazardous tasks before anesthetic medications have been fully excreted/metabolized.   Recent medications relevant to anesthetic plan: See MAR or Med Review  Personal or family history of anesthesia complications: no  Pregnancy Status: Not applicable  Mortality risks associated with anesthesia based on ASA-PS (PMID [id number]): ASA-PS III: Estimated risk 1:3,500

## 2023-11-22 NOTE — ANESTHESIA POSTPROCEDURE EVALUATION
Post-Op Assessment Note    CV Status:  Stable  Pain Score: 0    Pain management: adequate       Mental Status:  Alert and awake   Hydration Status:  Euvolemic   PONV Controlled:  Controlled   Airway Patency:  Patent     Post Op Vitals Reviewed: Yes    No anethesia notable event occurred.     Staff: CRNA               BP   126/75   Temp      Pulse  59   Resp   20   SpO2   97

## 2023-12-03 PROCEDURE — 88305 TISSUE EXAM BY PATHOLOGIST: CPT | Performed by: PATHOLOGY

## 2023-12-04 ENCOUNTER — TELEPHONE (OUTPATIENT)
Dept: GASTROENTEROLOGY | Facility: CLINIC | Age: 83
End: 2023-12-04

## 2023-12-04 NOTE — TELEPHONE ENCOUNTER
I called patient to inform her of the results, Patient stated she is still impacted and having trouble with her bowels she is going only liquid with the linzess and could not have her colonoscopy done because of how impacted she was patient is asking for a sooner appt with Dr. Pily Whaley but was informed no sooner appointments are available at this time but she can be added to wait list , please advise , Thank you

## 2023-12-04 NOTE — TELEPHONE ENCOUNTER
Patient is willing to see STEPHANIE Rodrigues this Wednesday at 11:30am in [place] location, Thank you

## 2023-12-06 ENCOUNTER — OFFICE VISIT (OUTPATIENT)
Dept: GASTROENTEROLOGY | Facility: CLINIC | Age: 83
End: 2023-12-06
Payer: MEDICARE

## 2023-12-06 VITALS
SYSTOLIC BLOOD PRESSURE: 128 MMHG | TEMPERATURE: 97.9 F | DIASTOLIC BLOOD PRESSURE: 66 MMHG | WEIGHT: 131.6 LBS | BODY MASS INDEX: 25.84 KG/M2 | HEIGHT: 60 IN

## 2023-12-06 DIAGNOSIS — K59.04 CHRONIC IDIOPATHIC CONSTIPATION: Primary | ICD-10-CM

## 2023-12-06 DIAGNOSIS — R68.81 EARLY SATIETY: ICD-10-CM

## 2023-12-06 DIAGNOSIS — R63.0 DECREASED APPETITE: ICD-10-CM

## 2023-12-06 DIAGNOSIS — R63.4 UNINTENTIONAL WEIGHT LOSS OF MORE THAN 10 POUNDS: ICD-10-CM

## 2023-12-06 PROCEDURE — 99214 OFFICE O/P EST MOD 30 MIN: CPT | Performed by: PHYSICIAN ASSISTANT

## 2023-12-06 RX ORDER — MAGNESIUM L-LACTATE 84 MG
84 TABLET, EXTENDED RELEASE ORAL DAILY
COMMUNITY

## 2023-12-06 RX ORDER — POTASSIUM CHLORIDE 20 MEQ/1
40 TABLET, EXTENDED RELEASE ORAL DAILY
COMMUNITY
Start: 2023-11-10 | End: 2024-01-09

## 2023-12-06 NOTE — PROGRESS NOTES
[facility] - Outpatient Follow-up Note  Eli Funes 79 y.o. female MRN: [id number]  Encounter: [id number]    ASSESSMENT AND PLAN:    1. Chronic idiopathic constipation  2. Decreased appetite  3. Early satiety  4. Unintentional weight loss of more than 10 pounds  Patient with ongoing significant constipation with what I expect to be overflow diarrhea/overflow loose stool. She has associated ongoing, worsening decreased appetite, early satiety, unintentional weight loss. We reviewed her recent EGD and biopsy results, labs. Patient expressed understanding to results. All questions answered. At this time I recommend we obtain a x-ray of her abdomen/flatplate to assess stool burden  Will also obtain a CT of her chest abdomen and pelvis with oral and IV contrast given her significant weight loss, early satiety, decreased appetite, worsening constipation  Instructed the patient to buy saline enemas over-the-counter. Instructed patient to self administer enemas 1-2 times a day over the next few days to loosen up her likely hard stool in the left side of her colon/rectum. She will continue with Linzess daily. Samples of higher dose 145 mcg given today in the office for her to try. She will call the office with an update in her stooling within the next week  Recommended ongoing high fiber diet, increased water intake, and activity as tolerated to prevent/ avoid constipation as well     Discussed that once her constipation is improved we will likely pursue colonoscopy, she is agreeable to this. Patient and her  were very appreciative of care today. - XR abdomen 1 view kub; Future  - CT chest abdomen pelvis w contrast; Future    Patient was instructed to call the office with any questions, concerns, new/ worsening/ persisting GI symptoms.  Advised patient go to the ER with any severe or worsening abdominal pain, fevers/ chills, intractable N/V, chest pain, SOB, dizziness, lightheadedness, feeling something stuck in esophagus that will not go down. Patient expressed understanding and is in agreement with treatment plan. Will plan to follow up after diagnostic tests in 1 month   __________________________________________________________    SUBJECTIVE:    Patient presents to the office today for follow-up. Patient was last seen in the office 11/9/2023 by Dr. Herman Ernst. Previous office note was reviewed. At last office visit an EGD was ordered, blood work, patient was started on Linzess 72 mcg daily. Her  is present for visit today. Patient complains of ongoing constipation since last visit. No new complaints. She is taking Linzess 72 mcg once daily. On this medication she is having ~ 3 Bms/ day but she states it is all "dirty water". She thinks the stool may just be "going around" harder stool. Patient notes there is no form for her stool. She notes her issues with constipation and bowels started about 4 months ago. She notes resolution in difficulty swallowing s/p dilation. She is very pleased about this. Her weight is down another 4 pounds since last office visit. She states she has lost almost 20 lbs over the last several months without trying. She has decreased appetite and early satiety x few months. Feels full very fast.   Patient denies any fevers/ chills, chronic /ongoing abdominal pain, nausea, vomiting, black or bloody stools, heartburn, acid reflux, dysphagia, odynophagia. Denies chest pain, SOB    Chart reviewed. Appears a colonoscopy was attempted at outside hospital on October 19, 2023-previous colonoscopy procedure note reviewed. Appears this procedure could not be completed due to fecal impaction noted in the rectum. The scope could not be passed beyond the rectum. A Fleet enema was recommended along with daily bowel regimen. Review of Systems   Constitutional:  Positive for unexpected weight change (weight loss).  Negative for chills and fever. HENT:  Negative for ear pain and sore throat. Eyes:  Negative for pain and visual disturbance. Respiratory:  Negative for cough and shortness of breath. Cardiovascular:  Negative for chest pain and palpitations. Gastrointestinal:  Positive for abdominal pain, constipation and diarrhea. Negative for vomiting. Genitourinary:  Negative for dysuria and hematuria. Musculoskeletal:  Negative for arthralgias and back pain. Skin:  Negative for color change and rash. Neurological:  Negative for seizures and syncope. All other systems reviewed and are negative. Historical Information   Past Medical History:   Diagnosis Date    CAD (coronary artery disease)     Hyperlipidemia     Hypertension     Kidney cyst, acquired     Kidney stone     MI, old 11/13/2021    NSTEMI (non-ST elevated myocardial infarction) [facility])      Past Surgical History:   Procedure Laterality Date    APPENDECTOMY      CHOLECYSTECTOMY      HYSTERECTOMY       Social History   Social History     Substance and Sexual Activity   Alcohol Use Never     Social History     Substance and Sexual Activity   Drug Use Never     Social History     Tobacco Use   Smoking Status Never   Smokeless Tobacco Never     History reviewed. No pertinent family history.     Meds/Allergies       Current Outpatient Medications:     aspirin 81 mg chewable tablet    atorvastatin (LIPITOR) 40 mg tablet    linaCLOtide (Linzess) 72 MCG CAPS    magnesium (MAGTAB) 84 MG (7MEQ) TBCR    metoprolol succinate (TOPROL-XL) 25 mg 24 hr tablet    potassium chloride (K-DUR,KLOR-CON) 20 mEq tablet    amLODIPine (NORVASC) 10 mg tablet    lisinopril (ZESTRIL) 10 mg tablet    Allergies   Allergen Reactions    Azithromycin Other (See Comments)    Iodinated Contrast Media Hives     ivp dye    Lidocaine Hives     Hives, dental work, 1980s    Hives, dental work, 1980s   hives    Metronidazole Other (See Comments)    Penicillins Hives     hives    Polyethylene Glycol Hives    Procaine Hives     1987 for cavity/filling; 45 minutes after injection. 1987 for cavity/filling; 45 minutes after injection. hives    Sodium Metabisulfite Other (See Comments)     Flushing    Sulfa Antibiotics Other (See Comments)    Ticagrelor Rash           Objective     Wt Readings from Last 3 Encounters:   12/06/23 59.7 kg (131 lb 9.6 oz)   11/09/23 61.2 kg (135 lb)   11/01/23 62.1 kg (136 lb 14.5 oz)     Temp Readings from Last 3 Encounters:   12/06/23 97.9 °F (36.6 °C)   11/22/23 (!) 97.3 °F (36.3 °C) (Temporal)   11/09/23 99.3 °F (37.4 °C) (Tympanic)     BP Readings from Last 3 Encounters:   12/06/23 128/66   11/22/23 129/60   11/09/23 140/72     Pulse Readings from Last 3 Encounters:   11/22/23 66   11/01/23 61          PHYSICAL EXAM:      Physical Exam  Vitals reviewed. Constitutional:       General: She is not in acute distress. Appearance: She is not toxic-appearing. HENT:      Head: Normocephalic and atraumatic. Eyes:      Extraocular Movements: Extraocular movements intact. Conjunctiva/sclera: Conjunctivae normal.   Cardiovascular:      Rate and Rhythm: Normal rate and regular rhythm. Pulmonary:      Effort: Pulmonary effort is normal. No respiratory distress. Breath sounds: Normal breath sounds. Abdominal:      General: Bowel sounds are normal.      Palpations: Abdomen is soft. Tenderness: There is abdominal tenderness (right sided). Musculoskeletal:         General: No swelling or tenderness. Cervical back: Normal range of motion and neck supple. Skin:     General: Skin is warm and dry. Coloration: Skin is not jaundiced. Neurological:      General: No focal deficit present. Mental Status: She is alert and oriented to person, place, and time. Mental status is at baseline. Psychiatric:         Mood and Affect: Mood normal.         Behavior: Behavior normal.         Thought Content:  Thought content normal.      Lab Results:   Lab Results   Component Value Date    WBC 7.06 11/01/2023    HGB 13.6 11/01/2023    HCT 39.1 11/01/2023    MCV 89 11/01/2023     11/01/2023       Lab Results   Component Value Date    SODIUM 141 11/01/2023    K 3.3 (L) 11/01/2023     11/01/2023    CO2 24 11/01/2023    AGAP 11 11/01/2023    BUN 19 11/01/2023    CREATININE 0.97 11/01/2023    GLUC 128 11/01/2023    CALCIUM 10.1 11/01/2023    AST 26 11/01/2023    ALT 40 11/01/2023    ALKPHOS 110 (H) 11/01/2023    TP 7.6 11/01/2023    TBILI 0.99 11/01/2023    EGFR 53 11/01/2023     Recent TSH and magnesium level from November 9, 2023 reviewed in [address] [place] [address], both were normal.  BMP from that date also reviewed, significant for stable potassium level, slightly low at 3.3. Glucose 118. Otherwise BMP was normal.    Radiology Results:   EGD    Result Date: 11/22/2023  Narrative: Table formatting from the original result was not included. [address] [place] [id number] [phone] DATE OF SERVICE: 11/22/23 PHYSICIAN(S): Attending: Selin Chowdary MD Fellow: No Staff Documented INDICATION: Esophageal dysphagia POST-OP DIAGNOSIS: See the impression below. PREPROCEDURE: Informed consent was obtained for the procedure, including sedation. Risks of perforation, hemorrhage, adverse drug reaction and aspiration were discussed. The patient was placed in the left lateral decubitus position. Patient was explained about the risks and benefits of the procedure. Risks including but not limited to bleeding, infection, and perforation were explained in detail. Also explained about less than 100% sensitivity with the exam and other alternatives. PROCEDURE: EGD DETAILS OF PROCEDURE: Patient was taken to the procedure room where a time out was performed to confirm correct patient and correct procedure.  The patient underwent monitored anesthesia care, which was administered by an anesthesia professional. The patient's blood pressure, heart rate, level of consciousness, respirations, oxygen, ETCO2 and ECG were monitored throughout the procedure. The scope was introduced through the mouth and advanced to the second part of the duodenum. Retroflexion was performed in the cardia, fundus and incisura. Prior to the procedure, the patient's H. Pylori status was unknown. The patient experienced no blood loss. The procedure was not difficult. The patient tolerated the procedure well. There were no apparent adverse events. ANESTHESIA INFORMATION: ASA: ASA status not filed in the log. Anesthesia Type: Anesthesia type not filed in the log. MEDICATIONS: No administrations occurring from 0728 to 0749 on 11/22/23 FINDINGS: The upper third of the esophagus and middle third of the esophagus appeared normal. 2 cm hiatal hernia - GE junction 35 cm from the incisors, diaphragmatic impression 37 cm from the incisors:  Hill classification: Grade IV Non-obstructing Schatzki ring in the GE junction; dilated with balloon dilator from 20 mm starting size to 20 mm end size.  Dilation caused bleeding, mucosal tears and disruption of ring; post-dilation bleeding was minimal; post-dilation mucosal tears were superficial Large diverticula containing food in the fundus of the stomach Erythematous mucosa in the antrum and pylorus; performed cold forceps biopsy to rule out H. pylori Erythematous and nodular mucosa in the duodenal bulb; performed cold forceps biopsy The 1st part of the duodenum and 2nd part of the duodenum appeared normal. One sessile polyp measuring smaller than 5 mm in the duodenal bulb; completely removed en bloc by cold snare and retrieved specimen SPECIMENS: ID Type Source Tests Collected by Time Destination 1 : duodenal polyp Tissue Duodenum TISSUE EXAM Ema Kathleen DO 11/22/2023  7:36 AM  2 : duodenal bulb bx Tissue Duodenum TISSUE EXAM Kathleen Main MD 11/22/2023  7:41 AM  3 : bx r/o h pylori Tissue Stomach TISSUE EXAM Ema Kathleen DO 11/22/2023 7:42 AM  4 : prox esophagus bx r/o eoe Tissue Esophagus TISSUE EXAM Summer Chambers,  11/22/2023  7:48 AM      Impression: The upper third of the esophagus and middle third of the esophagus appeared normal. 2 cm hiatal hernia Schatzki ring in the GE junction; dilated to 20 mm end size. Gastritis.  Biopsied to r/o HPylori Nodular mucosa in duodenal bulb - biopsied Subcentimeter polyp in the duodenal bulb was removed with cold snare RECOMMENDATION:  Await pathology results  Follow up with me in clinic   Quenten Rainwater, MD Reuel Najjar PA-C   Available on [id number]

## 2023-12-06 NOTE — PATIENT INSTRUCTIONS
High Fiber Diet   AMBULATORY CARE:   A high-fiber diet  includes foods that have a high amount of fiber. Fiber is the part of fruits, vegetables, and grains that is not broken down by your body. Fiber keeps your bowel movements regular. Fiber can also help lower your cholesterol level, control blood sugar in people with diabetes, and relieve constipation. Fiber can also help you control your weight because it helps you feel full faster. Most adults should eat 25 to 35 grams of fiber each day. Talk to your dietitian or healthcare provider about the amount of fiber you need. Good sources of fiber:       Foods with at least 4 grams of fiber per serving:      ? to ½ cup of high-fiber cereal (check the nutrition label on the box)    ½ cup of blackberries or raspberries    4 dried prunes    1 cooked artichoke    ½ cup of cooked legumes, such as lentils, or red, kidney, and rodriguez beans    Foods with 1 to 3 grams of fiber per servin slice of whole-wheat, pumpernickel, or rye bread    ½ cup of cooked brown rice    4 whole-wheat crackers    1 cup of oatmeal    ½ cup of cereal with 1 to 3 grams of fiber per serving (check the nutrition label on the box)    1 small piece of fruit, such as an apple, banana, pear, kiwi, or orange    3 dates    ½ cup of canned apricots, fruit cocktail, peaches, or pears    ½ cup of raw or cooked vegetables, such as carrots, cauliflower, cabbage, spinach, squash, or corn  Ways that you can increase fiber in your diet:   Choose brown or wild rice instead of white rice. Use whole wheat flour in recipes instead of white or all-purpose flour. Add beans and peas to casseroles or soups. Choose fresh fruit and vegetables with peels or skins on instead of juices. Other diet guidelines to follow: Add fiber to your diet slowly. You may have abdominal discomfort, bloating, and gas if you add fiber to your diet too quickly. Drink plenty of liquids as you add fiber to your diet. You may have nausea or develop constipation if you do not drink enough water. Ask how much liquid to drink each day and which liquids are best for you. © Copyright Rebecca Ayon 2023 Information is for End User's use only and may not be sold, redistributed or otherwise used for commercial purposes. The above information is an  only. It is not intended as medical advice for individual conditions or treatments. Talk to your doctor, nurse or pharmacist before following any medical regimen to see if it is safe and effective for you.   CT scheduled 12/13/23 @[facility], barium given in office

## 2023-12-07 ENCOUNTER — HOSPITAL ENCOUNTER (OUTPATIENT)
Dept: RADIOLOGY | Facility: HOSPITAL | Age: 83
End: 2023-12-07
Payer: MEDICARE

## 2023-12-07 DIAGNOSIS — K59.04 CHRONIC IDIOPATHIC CONSTIPATION: ICD-10-CM

## 2023-12-07 PROCEDURE — 74018 RADEX ABDOMEN 1 VIEW: CPT

## 2023-12-08 NOTE — RESULT ENCOUNTER NOTE
Please call patient and let her know that I reviewed her abdominal X ray results   Overall it is showing a non-obstructive bowel gas pattern. No signs or comment of significant constipation which is great news  The x-ray does also show a calcification in her lower left kidney, possibly representing a kidney stone in her left kidney    I recommend that she follow-up with her PCP for a nonemergent left kidney ultrasound at some point to follow-up on this finding. I will let her know the results of her CT scan once I receive them. I hope she is feeling better. Any questions or concerns please let me know. Thanks!   Demetrios Favre PA-C

## 2023-12-11 ENCOUNTER — NURSE TRIAGE (OUTPATIENT)
Age: 83
End: 2023-12-11

## 2023-12-11 NOTE — TELEPHONE ENCOUNTER
Patient and daughter wanted to confirm CT scheduled 12/13/23 is with contrast. I reviewed the following and no further questions:     CT chest abdomen pelvis w contrast      With PO and IV. Reason for Disposition   Information only question and nurse able to answer    Answer Assessment - Initial Assessment Questions  1. REASON FOR CALL or QUESTION: Patient and daughter Lianne Donato called to confirm CT Abdomen appt. Protocols used:  Information Only Call - No Triage-ADULT-[place]

## 2023-12-13 ENCOUNTER — HOSPITAL ENCOUNTER (OUTPATIENT)
Dept: CT IMAGING | Facility: HOSPITAL | Age: 83
Discharge: HOME | End: 2023-12-13
Payer: MEDICARE

## 2023-12-13 DIAGNOSIS — R68.81 EARLY SATIETY: ICD-10-CM

## 2023-12-13 DIAGNOSIS — R63.0 DECREASED APPETITE: ICD-10-CM

## 2023-12-13 DIAGNOSIS — R63.4 UNINTENTIONAL WEIGHT LOSS OF MORE THAN 10 POUNDS: ICD-10-CM

## 2023-12-13 PROCEDURE — 71250 CT THORAX DX C-: CPT

## 2023-12-13 PROCEDURE — G1004 CDSM NDSC: HCPCS

## 2023-12-13 PROCEDURE — 74176 CT ABD & PELVIS W/O CONTRAST: CPT

## 2023-12-15 ENCOUNTER — TELEPHONE (OUTPATIENT)
Age: 83
End: 2023-12-15

## 2023-12-15 ENCOUNTER — HOSPITAL ENCOUNTER (EMERGENCY)
Facility: HOSPITAL | Age: 83
Discharge: HOME | End: 2023-12-15
Attending: EMERGENCY MEDICINE | Admitting: EMERGENCY MEDICINE
Payer: MEDICARE

## 2023-12-15 VITALS
RESPIRATION RATE: 16 BRPM | OXYGEN SATURATION: 99 % | DIASTOLIC BLOOD PRESSURE: 74 MMHG | SYSTOLIC BLOOD PRESSURE: 154 MMHG | TEMPERATURE: 98.5 F | HEART RATE: 78 BPM

## 2023-12-15 DIAGNOSIS — K59.00 CONSTIPATION: Primary | ICD-10-CM

## 2023-12-15 PROCEDURE — 99284 EMERGENCY DEPT VISIT MOD MDM: CPT

## 2023-12-15 PROCEDURE — 99283 EMERGENCY DEPT VISIT LOW MDM: CPT | Performed by: EMERGENCY MEDICINE

## 2023-12-15 NOTE — RESULT ENCOUNTER NOTE
I attempted to call the patient re: imaging results, no answer   I left a voicemail instructing the patient to call the office back re: results or if she has any questions or concerns    I did explain in my voicemail that it is Friday evening and that I will be out of the office for the weekend    I will try to speak with the patient regarding her results on Monday

## 2023-12-15 NOTE — TELEPHONE ENCOUNTER
Called pt and told her I will call reading room and have them read by Monday because pt is going for a trip w[place] and wanted to know if she should cancel depending on findings. Reading room called.

## 2023-12-15 NOTE — TELEPHONE ENCOUNTER
Patients GI provider:  STEPHANIE Morgan    Number to return call: [phone]    Reason for call: Pt calling wanting to know if her results are in for CT scan.     Scheduled procedure/appointment date if applicable: Apt 7/53/45

## 2023-12-15 NOTE — TELEPHONE ENCOUNTER
SPOKE WITH PT, INFORMED OF RECOMMENDATION TO GO TO ER, PT DECLINES AT THIS TIME, HAS FAMILY FRIEND WILLING TO HELP HER WITH DISIMPACTION AT HOME. PT UNDERSTANDS ER IS AN OPTION FOR HER IF SYMPTOMS PERSIST.

## 2023-12-16 NOTE — ED PROVIDER NOTES
History  Chief Complaint   Patient presents with    Constipation     Pt states she has been constipated for 3 months, has been trying enemas and otc meds at home without relief, had outpatient CT performed and was told to come to the ED      This is an 83-year-old female presenting to the ED for evaluation of ongoing constipation for several months.  Patient states that she has been using stool softeners and has had Fleet enema and is adhering to a rich diet with fiber however has not been having any bowel movements.  She cannot recall when she last had a bowel movement.  Patient had an outpatient CAT scan that was performed and told that she had an impaction in encouraged to come to the ED for evaluation.        Prior to Admission Medications   Prescriptions Last Dose Informant Patient Reported? Taking?   amLODIPine (NORVASC) 10 mg tablet   Yes No   Sig: Take 10 mg by mouth daily   aspirin 81 mg chewable tablet  Self Yes No   Sig: Chew 81 mg daily   atorvastatin (LIPITOR) 40 mg tablet  Self Yes No   Sig: Take 40 mg by mouth daily   linaCLOtide (Linzess) 72 MCG CAPS  Self No No   Sig: Take 72 mcg by mouth daily   lisinopril (ZESTRIL) 10 mg tablet  Self Yes No   Sig: Take 10 mg by mouth daily   Patient not taking: Reported on 12/6/2023   magnesium (MAGTAB) 84 MG (7MEQ) TBCR  Self Yes No   Sig: Take 84 mg by mouth daily   metoprolol succinate (TOPROL-XL) 25 mg 24 hr tablet  Self Yes No   Sig: Take 25 mg by mouth daily   potassium chloride (K-DUR,KLOR-CON) 20 mEq tablet  Self Yes No   Sig: Take 40 mEq by mouth daily      Facility-Administered Medications: None       Past Medical History:   Diagnosis Date    CAD (coronary artery disease)     Hyperlipidemia     Hypertension     Kidney cyst, acquired     Kidney stone     MI, old 11/13/2021    NSTEMI (non-ST elevated myocardial infarction) (HCC)        Past Surgical History:   Procedure Laterality Date    APPENDECTOMY      CHOLECYSTECTOMY      HYSTERECTOMY         History  reviewed. No pertinent family history.  I have reviewed and agree with the history as documented.    E-Cigarette/Vaping    E-Cigarette Use Never User      E-Cigarette/Vaping Substances    Nicotine No     THC No     CBD No     Flavoring No     Other No     Unknown No      Social History     Tobacco Use    Smoking status: Never    Smokeless tobacco: Never   Vaping Use    Vaping status: Never Used   Substance Use Topics    Alcohol use: Never    Drug use: Never       Review of Systems    Physical Exam  Physical Exam  Vitals and nursing note reviewed.   Constitutional:       General: She is not in acute distress.     Appearance: Normal appearance. She is well-developed and normal weight.   HENT:      Head: Normocephalic and atraumatic.      Right Ear: External ear normal.      Left Ear: External ear normal.      Nose: Nose normal.   Eyes:      Conjunctiva/sclera: Conjunctivae normal.   Neck:      Vascular: No carotid bruit.   Cardiovascular:      Rate and Rhythm: Normal rate and regular rhythm.      Heart sounds: No murmur heard.  Pulmonary:      Effort: Pulmonary effort is normal. No respiratory distress.      Breath sounds: Normal breath sounds.   Abdominal:      General: Bowel sounds are normal. There is no distension.      Palpations: Abdomen is soft. There is no mass.      Tenderness: There is no abdominal tenderness.   Genitourinary:     Comments: Large amount of stool in the rectal vault  Musculoskeletal:         General: No swelling, tenderness, deformity or signs of injury. Normal range of motion.      Cervical back: Normal range of motion and neck supple. No rigidity or tenderness.      Right lower leg: No edema.      Left lower leg: No edema.   Lymphadenopathy:      Cervical: No cervical adenopathy.   Skin:     General: Skin is warm and dry.      Capillary Refill: Capillary refill takes less than 2 seconds.   Neurological:      General: No focal deficit present.      Mental Status: She is alert and oriented to  person, place, and time. Mental status is at baseline.      Cranial Nerves: No cranial nerve deficit.      Sensory: No sensory deficit.      Motor: No weakness.      Gait: Gait normal.   Psychiatric:         Mood and Affect: Mood normal.         Vital Signs  ED Triage Vitals   Temperature Pulse Respirations Blood Pressure SpO2   12/15/23 1857 12/15/23 1857 12/15/23 1857 12/15/23 1859 12/15/23 1857   98.5 °F (36.9 °C) 78 16 154/74 99 %      Temp Source Heart Rate Source Patient Position - Orthostatic VS BP Location FiO2 (%)   12/15/23 1857 12/15/23 1857 12/15/23 1857 12/15/23 1857 --   Temporal Monitor Sitting Left arm       Pain Score       12/15/23 1857       2           Vitals:    12/15/23 1857 12/15/23 1859   BP:  154/74   Pulse: 78    Patient Position - Orthostatic VS: Sitting          Visual Acuity      ED Medications  Medications - No data to display    Diagnostic Studies  Results Reviewed       None                   No orders to display              Procedures  Procedures         ED Course  ED Course as of 12/15/23 2325   Fri Dec 15, 2023   2226 Was able to manually disimpact some stool from rectal vault. Will attempt soap suds enema to help evacuate the remaining stool.   2311 Patient had successful bowel movement with soap suds enema. She is stable for discharge.                                              Medical Decision Making  Differential diagnosis includes but not limited to: Constipation, fecal impaction, ileus, poor diet             Disposition  Final diagnoses:   Constipation     Time reflects when diagnosis was documented in both MDM as applicable and the Disposition within this note       Time User Action Codes Description Comment    12/15/2023 11:11 PM Pretty Hunter Add [K59.00] Constipation           ED Disposition       ED Disposition   Discharge    Condition   Stable    Date/Time   Fri Dec 15, 2023 11:10 PM    Comment   Naomy Sabillon discharge to home/self care.                    Follow-up Information       Follow up With Specialties Details Why Contact Info    Anushka Odonnell PA-C Physician Assistant Schedule an appointment as soon as possible for a visit in 1 week As needed [address] [place] [address]  Dorota BROWN [address] [phone]              Patient's Medications   Discharge Prescriptions    No medications on file       No discharge procedures on file.    PDMP Review       None            ED Provider  Electronically Signed by             Pretty Hunter,   12/15/23 2508

## 2023-12-16 NOTE — ED NOTES
Pt had a moderate BM after enema. Dc instructions provided. Denies any questions at this time.      Sharmaine aPtel RN  12/15/23 6252

## 2024-02-14 ENCOUNTER — OFFICE VISIT (OUTPATIENT)
Dept: GASTROENTEROLOGY | Facility: CLINIC | Age: 84
End: 2024-02-14
Payer: MEDICARE

## 2024-02-14 VITALS
SYSTOLIC BLOOD PRESSURE: 138 MMHG | TEMPERATURE: 96.6 F | BODY MASS INDEX: 26.31 KG/M2 | WEIGHT: 134 LBS | DIASTOLIC BLOOD PRESSURE: 60 MMHG | HEIGHT: 60 IN

## 2024-02-14 DIAGNOSIS — R13.19 ESOPHAGEAL DYSPHAGIA: ICD-10-CM

## 2024-02-14 DIAGNOSIS — K59.04 CHRONIC IDIOPATHIC CONSTIPATION: Primary | ICD-10-CM

## 2024-02-14 DIAGNOSIS — R19.7 NAUSEA VOMITING AND DIARRHEA: ICD-10-CM

## 2024-02-14 DIAGNOSIS — R11.2 NAUSEA VOMITING AND DIARRHEA: ICD-10-CM

## 2024-02-14 DIAGNOSIS — I21.4 NSTEMI (NON-ST ELEVATED MYOCARDIAL INFARCTION) (HCC): Chronic | ICD-10-CM

## 2024-02-14 PROCEDURE — 99215 OFFICE O/P EST HI 40 MIN: CPT | Performed by: INTERNAL MEDICINE

## 2024-02-14 RX ORDER — SODIUM PICOSULFATE, MAGNESIUM OXIDE, AND ANHYDROUS CITRIC ACID 12; 3.5; 1 G/175ML; G/175ML; MG/175ML
LIQUID ORAL
Qty: 350 ML | Refills: 0 | Status: SHIPPED | OUTPATIENT
Start: 2024-02-14

## 2024-02-14 NOTE — PATIENT INSTRUCTIONS
Scheduled date of colonoscopy (as of today):03.22.24  Physician performing colonoscopy:DR TAPIA  Location of colonoscopy:  Bowel prep reviewed with patient:CLENPIQ  Instructions reviewed with patient by:JAILYN  Clearances: N/A

## 2024-02-14 NOTE — PROGRESS NOTES
[facility]  Progress Note - Naomy Sabillon 83 y.o. female MRN: [id number]    Unit/Bed#:  Encounter: [id number]    Assessment/Plan:  1. Chronic idiopathic constipation-stable at this time  - sodium picosulfate, magnesium oxide, citric acid (Clenpiq) oral solution; Take 175 mL (1 bottle) the evening before the colonoscopy, between 5 PM and 9 PM, followed by a second 175 mL bottle 5 hours before the colonoscopy.  Dispense: 350 mL; Refill: 0  - Colonoscopy; Future    2. Nausea vomiting and diarrhea-resolved with improvement of constipation  3. NSTEMI (non-ST elevated myocardial infarction) (HCC)  4. Esophageal dysphagia-overall stable denies any active symptoms at this time.  EGD evaluation in 2023 demonstrated large diverticula with food in the fundus of the stomach.  Schatzki's ring that was dilated to 20 mm.  Symptoms are much improved after dilation.    Patient also does have history of sulfa allergy and MiraLAX allergy. Allergy is mild with associated rash.  Prior to taking preparation for colonoscopy patient plans to go on the Benadryl and H2 blocker prep for history of allergy.    We reviewed her imaging study which demonstrated increased fecal burden and symptoms improved after fecal disimpaction.  She is currently taking stool softener and off Linzess.  Last colonoscopy otherwise was over 10 to 15 years ago.  Recent attempted colonoscopy was not completed due to large fecal burden.  After extensive discussion we will plan for colonoscopy due to change in bowel habits.          Objective:     Vitals: Blood pressure 138/60, temperature (!) 96.6 °F (35.9 °C), temperature source Tympanic, height 5' (1.524 m), weight 60.8 kg (134 lb).,Body mass index is 26.17 kg/m².    @JIM@    Physical Exam:    GEN: wn/wd, NAD  HEENT: MMM, no cervical or supraclavicular LAD, anciteric  CV: RRR, no m/r/g  CHEST: CTA b/l, no w/r/r  ABD: +BS, soft, NT/ND, no hepatosplenomegaly  EXT: no c/c/e  SKIN: no  rashes  NEURO: aaox3      Invasive Devices       None                           Lab, Imaging and other studies:     No visits with results within 1 Day(s) from this visit.   Latest known visit with results is:   Hospital Outpatient Visit on 11/22/2023   Component Date Value    Case Report 11/22/2023                      Value:Surgical Pathology Report                         Case: [id number]                                   Authorizing Provider:  Gaston Olivas MD             Collected:           11/22/2023 0736              Ordering Location:     [facility] Received:            11/22/2023 1015                                     Endoscopy                                                                    Pathologist:           Eliezer Argueta MD                                                             Specimens:   A) - Duodenum, duodenal polyp                                                                       B) - Duodenum, duodenal bulb bx                                                                     C) - Stomach, bx r/o h pylori                                                                       D) - Esophagus, prox esophagus bx r/o eoe                                                  Final Diagnosis 11/22/2023                      Value:This result contains rich text formatting which cannot be displayed here.    Additional Information 11/22/2023                      Value:This result contains rich text formatting which cannot be displayed here.    Gross Description 11/22/2023                      Value:This result contains rich text formatting which cannot be displayed here.             No current facility-administered medications for this visit.

## 2024-02-15 ENCOUNTER — TELEPHONE (OUTPATIENT)
Age: 84
End: 2024-02-15

## 2024-02-15 NOTE — TELEPHONE ENCOUNTER
Patients GI provider:  Dr. Olivas    Number to return call: [phone]    Reason for call: Pt calling because the clenpiq is $200 and the pt does not want to pay that much. Pt requesting a different prep be sent to the pharmcy    Scheduled procedure/appointment date if applicable: Procedure 3/22/24

## 2024-02-15 NOTE — TELEPHONE ENCOUNTER
Spoke with pt, gave her the miralax option and also advised her we had a sample kit in the office. Pt stated she will come  kit sometime next week.

## 2024-03-14 ENCOUNTER — ANESTHESIA (OUTPATIENT)
Dept: ANESTHESIOLOGY | Facility: HOSPITAL | Age: 84
End: 2024-03-14

## 2024-03-14 ENCOUNTER — ANESTHESIA EVENT (OUTPATIENT)
Dept: ANESTHESIOLOGY | Facility: HOSPITAL | Age: 84
End: 2024-03-14

## 2024-03-15 ENCOUNTER — TELEPHONE (OUTPATIENT)
Dept: GASTROENTEROLOGY | Facility: CLINIC | Age: 84
End: 2024-03-15

## 2024-03-15 NOTE — TELEPHONE ENCOUNTER
Patients GI provider:  Dr. Olivas    Number to return call: [phone]    Reason for call: Pt calling to find out if colonoscopy will need Authorization and if it will be covered by her insurance?    Scheduled procedure/appointment date if applicable: procedure 3/22

## 2024-03-22 ENCOUNTER — ANESTHESIA EVENT (OUTPATIENT)
Dept: GASTROENTEROLOGY | Facility: HOSPITAL | Age: 84
End: 2024-03-22

## 2024-03-22 ENCOUNTER — ANESTHESIA (OUTPATIENT)
Dept: GASTROENTEROLOGY | Facility: HOSPITAL | Age: 84
End: 2024-03-22

## 2024-03-22 ENCOUNTER — HOSPITAL ENCOUNTER (OUTPATIENT)
Dept: GASTROENTEROLOGY | Facility: HOSPITAL | Age: 84
Setting detail: OUTPATIENT SURGERY
End: 2024-03-22
Attending: INTERNAL MEDICINE
Payer: MEDICARE

## 2024-03-22 VITALS
BODY MASS INDEX: 30.09 KG/M2 | RESPIRATION RATE: 18 BRPM | WEIGHT: 130 LBS | TEMPERATURE: 97.4 F | HEART RATE: 76 BPM | HEIGHT: 55 IN | DIASTOLIC BLOOD PRESSURE: 59 MMHG | OXYGEN SATURATION: 99 % | SYSTOLIC BLOOD PRESSURE: 119 MMHG

## 2024-03-22 DIAGNOSIS — K59.04 CHRONIC IDIOPATHIC CONSTIPATION: ICD-10-CM

## 2024-03-22 PROBLEM — Z87.19 HISTORY OF ESOPHAGEAL STRICTURE: Status: ACTIVE | Noted: 2021-12-07

## 2024-03-22 PROBLEM — I25.2 HISTORY OF NON-ST ELEVATION MYOCARDIAL INFARCTION (NSTEMI): Status: ACTIVE | Noted: 2023-10-27

## 2024-03-22 PROBLEM — N28.1 RENAL CYST, LEFT: Status: ACTIVE | Noted: 2022-07-04

## 2024-03-22 PROBLEM — T80.69XA ALLERGIC REACTION TO VACCINE: Status: ACTIVE | Noted: 2023-10-27

## 2024-03-22 PROBLEM — I35.8 AORTIC VALVE MASS: Status: ACTIVE | Noted: 2022-04-04

## 2024-03-22 PROBLEM — N20.0 LEFT NEPHROLITHIASIS: Status: ACTIVE | Noted: 2023-10-27

## 2024-03-22 PROBLEM — Z85.828 HISTORY OF BASAL CELL CARCINOMA OF SKIN: Status: ACTIVE | Noted: 2023-06-07

## 2024-03-22 PROBLEM — I25.10 CORONARY ARTERY DISEASE: Status: ACTIVE | Noted: 2022-06-22

## 2024-03-22 PROCEDURE — 45385 COLONOSCOPY W/LESION REMOVAL: CPT | Performed by: INTERNAL MEDICINE

## 2024-03-22 PROCEDURE — 88305 TISSUE EXAM BY PATHOLOGIST: CPT | Performed by: PATHOLOGY

## 2024-03-22 RX ORDER — SODIUM CHLORIDE, SODIUM LACTATE, POTASSIUM CHLORIDE, CALCIUM CHLORIDE 600; 310; 30; 20 MG/100ML; MG/100ML; MG/100ML; MG/100ML
INJECTION, SOLUTION INTRAVENOUS CONTINUOUS PRN
Status: DISCONTINUED | OUTPATIENT
Start: 2024-03-22 | End: 2024-03-22

## 2024-03-22 RX ORDER — PROPOFOL 10 MG/ML
INJECTION, EMULSION INTRAVENOUS AS NEEDED
Status: DISCONTINUED | OUTPATIENT
Start: 2024-03-22 | End: 2024-03-22

## 2024-03-22 RX ORDER — LIDOCAINE HYDROCHLORIDE 10 MG/ML
INJECTION, SOLUTION EPIDURAL; INFILTRATION; INTRACAUDAL; PERINEURAL AS NEEDED
Status: DISCONTINUED | OUTPATIENT
Start: 2024-03-22 | End: 2024-03-22

## 2024-03-22 RX ORDER — GLYCOPYRROLATE 0.2 MG/ML
INJECTION INTRAMUSCULAR; INTRAVENOUS AS NEEDED
Status: DISCONTINUED | OUTPATIENT
Start: 2024-03-22 | End: 2024-03-22

## 2024-03-22 RX ADMIN — PROPOFOL 20 MG: 10 INJECTION, EMULSION INTRAVENOUS at 10:55

## 2024-03-22 RX ADMIN — LIDOCAINE HYDROCHLORIDE 50 MG: 10 INJECTION, SOLUTION EPIDURAL; INFILTRATION; INTRACAUDAL; PERINEURAL at 10:40

## 2024-03-22 RX ADMIN — SODIUM CHLORIDE, SODIUM LACTATE, POTASSIUM CHLORIDE, AND CALCIUM CHLORIDE: .6; .31; .03; .02 INJECTION, SOLUTION INTRAVENOUS at 10:29

## 2024-03-22 RX ADMIN — PROPOFOL 20 MG: 10 INJECTION, EMULSION INTRAVENOUS at 10:46

## 2024-03-22 RX ADMIN — PROPOFOL 20 MG: 10 INJECTION, EMULSION INTRAVENOUS at 10:44

## 2024-03-22 RX ADMIN — PROPOFOL 60 MG: 10 INJECTION, EMULSION INTRAVENOUS at 10:40

## 2024-03-22 RX ADMIN — PROPOFOL 20 MG: 10 INJECTION, EMULSION INTRAVENOUS at 10:49

## 2024-03-22 RX ADMIN — PROPOFOL 60 MG: 10 INJECTION, EMULSION INTRAVENOUS at 10:43

## 2024-03-22 RX ADMIN — GLYCOPYRROLATE 0.2 MG: 0.2 INJECTION INTRAMUSCULAR; INTRAVENOUS at 10:45

## 2024-03-22 RX ADMIN — PROPOFOL 20 MG: 10 INJECTION, EMULSION INTRAVENOUS at 10:42

## 2024-03-22 NOTE — ANESTHESIA POSTPROCEDURE EVALUATION
Post-Op Assessment Note    CV Status:  Stable    Pain management: satisfactory to patient       Mental Status:  Sleepy and arousable   Hydration Status:  Euvolemic   PONV Controlled:  Controlled   Airway Patency:  Patent     Post Op Vitals Reviewed: Yes    No anethesia notable event occurred.    Staff: Anesthesiologist, CRNA           /51 (03/22/24 1102)    Temp (!) 97.4 °F (36.3 °C) (03/22/24 1102)    Pulse 73 (03/22/24 1102)   Resp 18 (03/22/24 1102)    SpO2 93 % (03/22/24 1102)

## 2024-03-22 NOTE — ANESTHESIA PREPROCEDURE EVALUATION
Procedure:  COLONOSCOPY    Relevant Problems   CARDIO   (+) Aortic valve mass   (+) CAD (coronary artery disease)   (+) Cardiac arrhythmia   (+) Coronary artery disease   (+) HLD (hyperlipidemia)   (+) Hypertension   (+) NSTEMI (non-ST elevated myocardial infarction) (HCC)      GI/HEPATIC   (+) Dysphagia      /RENAL   (+) Left nephrolithiasis   (+) Renal cyst, left      FEN/Gastrointestinal   (+) Chronic idiopathic constipation   (+) Nausea vomiting and diarrhea      Other   (+) History of basal cell carcinoma of skin   (+) History of esophageal stricture      Lab Results   Component Value Date    SODIUM 143 11/28/2023    K 3.6 11/28/2023     11/28/2023    CO2 28 11/28/2023    AGAP 10 11/28/2023    BUN 15 11/28/2023    CREATININE 0.86 11/28/2023    GLUC 137 (H) 11/28/2023    CALCIUM 9.8 11/28/2023    AST 26 11/01/2023    ALT 40 11/01/2023    ALKPHOS 110 (H) 11/01/2023    TP 7.6 11/01/2023    TBILI 0.99 11/01/2023    EGFR 66 11/28/2023     Lab Results   Component Value Date    WBC 7.06 11/01/2023    HGB 13.6 11/01/2023    HCT 39.1 11/01/2023    MCV 89 11/01/2023     11/01/2023       Physical Exam    Airway    Mallampati score: II  TM Distance: >3 FB  Neck ROM: limited     Dental       Cardiovascular      Pulmonary      Other Findings  post-pubertal.      Anesthesia Plan  ASA Score- 3     Anesthesia Type- IV sedation with anesthesia with ASA Monitors.         Additional Monitors:     Airway Plan:            Plan Factors-Exercise tolerance (METS): >4 METS.    Chart reviewed. EKG reviewed. Imaging results reviewed. Existing labs reviewed. Patient summary reviewed.                  Induction- intravenous.    Postoperative Plan-     Informed Consent- Anesthetic plan and risks discussed with patient.  I personally reviewed this patient with the CRNA. Discussed and agreed on the Anesthesia Plan with the CRNA..

## 2024-03-22 NOTE — H&P
History and Physical - [facility]  Naomy Sabillon 83 y.o. female MRN: [id number]                  HPI: Naomy Sabillon is a 83 y.o. year old female who presents for history of constipation.  Constipation      REVIEW OF SYSTEMS: Per the HPI, and otherwise unremarkable.    Historical Information   Past Medical History:   Diagnosis Date    CAD (coronary artery disease)     Hyperlipidemia     Hypertension     Kidney cyst, acquired     Kidney stone     MI, old 11/13/2021    NSTEMI (non-ST elevated myocardial infarction) (HCC)      Past Surgical History:   Procedure Laterality Date    APPENDECTOMY      CHOLECYSTECTOMY      HYSTERECTOMY       Social History   Social History     Substance and Sexual Activity   Alcohol Use Never     Social History     Substance and Sexual Activity   Drug Use Never     Social History     Tobacco Use   Smoking Status Never   Smokeless Tobacco Never     History reviewed. No pertinent family history.    Meds/Allergies       Current Outpatient Medications:     amLODIPine (NORVASC) 10 mg tablet    atorvastatin (LIPITOR) 40 mg tablet    metoprolol succinate (TOPROL-XL) 25 mg 24 hr tablet    aspirin 81 mg chewable tablet    lisinopril (ZESTRIL) 10 mg tablet    magnesium (MAGTAB) 84 MG (7MEQ) TBCR    sodium picosulfate, magnesium oxide, citric acid (Clenpiq) oral solution    Allergies   Allergen Reactions    Azithromycin Other (See Comments)    Iodinated Contrast Media Hives     ivp dye    Lidocaine Hives     Hives, dental work, 1980s    Hives, dental work, 1980s   hives    Metronidazole Other (See Comments)    Penicillins Hives     hives    Polyethylene Glycol Hives    Procaine Hives     1987 for cavity/filling; 45 minutes after injection.     1987 for cavity/filling; 45 minutes after injection.    hives    Sodium Metabisulfite Other (See Comments)     Flushing    Sulfa Antibiotics Other (See Comments)    Ticagrelor Rash       Objective     /62   Pulse 97    "Temp 97.7 °F (36.5 °C) (Temporal)   Resp 18   Ht (!) 5\" (0.127 m)   Wt 59 kg (130 lb)   SpO2 100%   BMI 3656.00 kg/m²       PHYSICAL EXAM    Gen: NAD  Head: NCAT  CV: RRR  CHEST: Clear  ABD: soft, NT/ND  EXT: no edema      ASSESSMENT/PLAN:  This is a 83 y.o. year old female here for colonoscopy, and she is stable and optimized for her procedure.       "

## 2024-03-26 PROCEDURE — 88305 TISSUE EXAM BY PATHOLOGIST: CPT | Performed by: PATHOLOGY

## 2024-03-26 NOTE — RESULT ENCOUNTER NOTE
Removed polyps are hyperplastic and adenoma but overall these are all benign without any evidence of high-grade dysplasia based on this repeat colonoscopy is not recommended at this time due to age

## 2025-05-09 ENCOUNTER — TELEPHONE (OUTPATIENT)
Age: 85
End: 2025-05-09

## 2025-05-09 NOTE — TELEPHONE ENCOUNTER
New Patient      Insurance   Current Insurance?   Insurance E-verified?     History   Reason for appointment/active symptoms? Chronic flank pain Calculus of kidney / Cyst of kidney, acquired, Unspecified abdominal pain       Has the patient had any previous Urologist(s)? N     Was the patient seen in the ED? N     Labs/Imaging(Including Out Of Network)? UPCOMING  CT SCAN     Records Requested? Y  Records Visible in EPIC? Y     Personal history of cancer? N     Appointment   Office location preference:  [facility]  ?   Appointment Details   Date:  6/17/25  Time:  8:40 AM   Location:  [facility]  Provider:  Liu  Does the appointment need further review? Y  Patient is seeking sooner appt due to her pain and discomfort. Please review.

## 2025-05-12 ENCOUNTER — TELEPHONE (OUTPATIENT)
Dept: UROLOGY | Facility: CLINIC | Age: 85
End: 2025-05-12

## 2025-05-12 NOTE — TELEPHONE ENCOUNTER
Spoke with pt, found sooner appt and booked. Pt appreciative. Pt has CT scan this week, will get images on a disc. Has a history of kidney stone and cyst on left side. Has pain and discomfort on and off last 6 months. Advised of ER protocol.

## 2025-05-29 ENCOUNTER — OFFICE VISIT (OUTPATIENT)
Dept: UROLOGY | Facility: CLINIC | Age: 85
End: 2025-05-29
Payer: MEDICARE

## 2025-05-29 VITALS
OXYGEN SATURATION: 96 % | SYSTOLIC BLOOD PRESSURE: 138 MMHG | HEART RATE: 72 BPM | DIASTOLIC BLOOD PRESSURE: 72 MMHG | BODY MASS INDEX: 29.39 KG/M2 | TEMPERATURE: 97.9 F | WEIGHT: 145.8 LBS | HEIGHT: 59 IN

## 2025-05-29 DIAGNOSIS — N20.0 NEPHROLITHIASIS: Primary | ICD-10-CM

## 2025-05-29 LAB
SL AMB  POCT GLUCOSE, UA: NORMAL
SL AMB LEUKOCYTE ESTERASE,UA: NORMAL
SL AMB POCT BILIRUBIN,UA: NORMAL
SL AMB POCT BLOOD,UA: NORMAL
SL AMB POCT CLARITY,UA: CLEAR
SL AMB POCT COLOR,UA: YELLOW
SL AMB POCT KETONES,UA: NORMAL
SL AMB POCT NITRITE,UA: NORMAL
SL AMB POCT PH,UA: 5.5
SL AMB POCT SPECIFIC GRAVITY,UA: 1.02
SL AMB POCT URINE PROTEIN: NORMAL
SL AMB POCT UROBILINOGEN: 1

## 2025-05-29 PROCEDURE — 81003 URINALYSIS AUTO W/O SCOPE: CPT | Performed by: UROLOGY

## 2025-05-29 PROCEDURE — 99203 OFFICE O/P NEW LOW 30 MIN: CPT | Performed by: UROLOGY

## 2025-05-29 NOTE — PROGRESS NOTES
UROLOGY PROGRESS NOTE         NAME: Naomy Sabillon  AGE: 84 y.o. SEX: female  : 1940   MRN: [id number]    DATE: 2025  TIME: 3:32 PM    Assessment and Plan      Impression:   1. Nephrolithiasis  -     POCT urine dip auto non-scope  14-year history of a slowly enlarging stone in the lower pole of her left kidney.  It is elongated and now 3.6 cm long.  It occupies the renal pelvis and lower pole calyx.  Not sure if her pain is related to her back or the stone.  Could be either     Plan: She will take some Tylenol arthritis in the morning and later in the day.  Will see if this alleviates the pain.  Otherwise we will consider a percutaneous nephrolithotomy.  We would likely get her down to the stone clinic at [place].      Chief Complaint     Chief Complaint   Patient presents with    Nephrolithiasis     Pt states she has a kidney stone as well as a cyst on the same kidney    Flank Pain     Left side     History of Present Illness     HPI: Naomy Sabillon is a 84 y.o. year old female who presents with history of an enlarging stone in the lower pole of her left kidney.  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  Long this measurement is now 3.6 cm it occupies the lower pole of her left kidney and goes up into the renal pelvis.  She has a renal cyst in her left kidney as well 3.6 cm.  She does have microscopic hematuria.  She does have pain that she gets almost on a daily basis it can be a shooting pain it typically does not radiate into her buttock or leg.  She can alleviate the pain by laying on her side.  No nausea with the pain.  The pain can last or be fleeting.  Attempt was made 14 years ago to treat the stone unsuccessfully.  She has been on observation since then.  She has been more bothered with the pain for the last 9 months.  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              The following portions of the patient's history were reviewed and updated as appropriate:  "allergies, current medications, past family history, past medical history, past social history, past surgical history and problem list.  Past Medical History[1]  Past Surgical History[2]  shoulder  Review of Systems     Const: Denies chills, fever and weight loss.  CV: Denies chest pain.  Resp: Denies SOB.  GI: Denies abdominal pain, nausea and vomiting.  : Denies symptoms other than stated above.  Musculo: Denies back pain.    Objective   /72   Pulse 72   Temp 97.9 °F (36.6 °C)   Ht 4' 11\" (1.499 m)   Wt 66.1 kg (145 lb 12.8 oz)   SpO2 96%   BMI 29.45 kg/m²     Physical Exam  Const: Appears healthy and well developed. No signs of acute distress present.  Resp: Respirations are regular and unlabored.   CV: Rate is regular. Rhythm is regular.  Abdomen: Abdomen is soft, nontender, and nondistended. Kidneys are not palpable.  :   Psych: Patient's attitude is cooperative. Mood is normal. Affect is normal.    Current Medications   Current Medications[3]        Frank D'Amico, MD             [1]   Past Medical History:  Diagnosis Date    CAD (coronary artery disease)     Hyperlipidemia     Hypertension     Kidney cyst, acquired     Kidney stone     MI, old 11/13/2021    NSTEMI (non-ST elevated myocardial infarction) (HCC)    [2]   Past Surgical History:  Procedure Laterality Date    APPENDECTOMY      CHOLECYSTECTOMY      HYSTERECTOMY     [3]   Current Outpatient Medications:     amLODIPine (NORVASC) 10 mg tablet, Take 10 mg by mouth in the morning., Disp: , Rfl:     aspirin 81 mg chewable tablet, Chew 81 mg in the morning., Disp: , Rfl:     atorvastatin (LIPITOR) 40 mg tablet, Take 40 mg by mouth in the morning., Disp: , Rfl:     metoprolol succinate (TOPROL-XL) 25 mg 24 hr tablet, Take 25 mg by mouth in the morning., Disp: , Rfl:     lisinopril (ZESTRIL) 10 mg tablet, Take 10 mg by mouth daily (Patient not taking: Reported on 12/6/2023), Disp: , Rfl:     magnesium (MAGTAB) 84 MG (7MEQ) TBCR, Take 84 mg by " mouth daily (Patient not taking: Reported on 3/22/2024), Disp: , Rfl: